# Patient Record
Sex: FEMALE | Race: WHITE | NOT HISPANIC OR LATINO
[De-identification: names, ages, dates, MRNs, and addresses within clinical notes are randomized per-mention and may not be internally consistent; named-entity substitution may affect disease eponyms.]

---

## 2019-10-20 ENCOUNTER — TRANSCRIPTION ENCOUNTER (OUTPATIENT)
Age: 44
End: 2019-10-20

## 2020-02-04 ENCOUNTER — APPOINTMENT (OUTPATIENT)
Dept: OBGYN | Facility: CLINIC | Age: 45
End: 2020-02-04
Payer: COMMERCIAL

## 2020-02-04 VITALS
WEIGHT: 195.77 LBS | SYSTOLIC BLOOD PRESSURE: 118 MMHG | BODY MASS INDEX: 30.37 KG/M2 | DIASTOLIC BLOOD PRESSURE: 70 MMHG | HEIGHT: 67.5 IN

## 2020-02-04 DIAGNOSIS — Z82.49 FAMILY HISTORY OF ISCHEMIC HEART DISEASE AND OTHER DISEASES OF THE CIRCULATORY SYSTEM: ICD-10-CM

## 2020-02-04 DIAGNOSIS — Z83.3 FAMILY HISTORY OF DIABETES MELLITUS: ICD-10-CM

## 2020-02-04 DIAGNOSIS — Z82.3 FAMILY HISTORY OF STROKE: ICD-10-CM

## 2020-02-04 DIAGNOSIS — N85.2 HYPERTROPHY OF UTERUS: ICD-10-CM

## 2020-02-04 PROCEDURE — 99386 PREV VISIT NEW AGE 40-64: CPT

## 2020-02-04 NOTE — CHIEF COMPLAINT
[Initial Visit] : initial GYN visit [FreeTextEntry1] : Moved from [place]. Last exam 2 years, normal pap at that time, no hx of abn paps.\par Menses regular,  heavier and crampier over past 2 years, last 5 days.\par Exercises 5 days/week 20 min at a time, can't lose weight.\par Stess incontinence occasionaly\par  x 2 1 C/S

## 2020-02-04 NOTE — PHYSICAL EXAM
[Awake] : awake [Alert] : alert [Acute Distress] : no acute distress [Mass] : no breast mass [Nipple Discharge] : no nipple discharge [Axillary LAD] : no axillary lymphadenopathy [Soft] : soft [Tender] : non tender [Oriented x3] : oriented to person, place, and time [Normal] : uterus [No Bleeding] : there was no active vaginal bleeding [Pap Obtained] : a Pap smear was performed [Anteversion] : anteverted [Uterine Adnexae] : were not tender and not enlarged [Enlarged ___ wks] : enlarged [unfilled] ~Uweeks [Adnexa Tenderness] : were not tender [Ovarian Mass (___ Cm)] : there were no adnexal masses

## 2020-02-04 NOTE — HISTORY OF PRESENT ILLNESS
[___ Year(s) Ago] : [unfilled] year(s) ago [Good] : being in good health [Healthy Diet] : a healthy diet [Regular Exercise] : regular exercise [Last Pap ___] : Last cervical pap smear was [unfilled] [Last Mammogram ___] : Last Mammogram was [unfilled] [Reproductive Age] : is of reproductive age [Menstrual Problems] : reports abnormal menses [Sexually Active] : is sexually active [Contraception] : does not use contraception [de-identified] : ok  if getys pregnant

## 2020-02-04 NOTE — COUNSELING
[Breast Self Exam] : breast self exam [Nutrition] : nutrition [Exercise] : exercise [Menstrual Calendar] : menstrual calendar [Vitamins/Supplements] : vitamins/supplements

## 2020-02-07 LAB — HPV HIGH+LOW RISK DNA PNL CVX: NOT DETECTED

## 2020-02-14 ENCOUNTER — FORM ENCOUNTER (OUTPATIENT)
Age: 45
End: 2020-02-14

## 2020-02-15 ENCOUNTER — APPOINTMENT (OUTPATIENT)
Dept: MAMMOGRAPHY | Facility: CLINIC | Age: 45
End: 2020-02-15
Payer: COMMERCIAL

## 2020-02-15 ENCOUNTER — OUTPATIENT (OUTPATIENT)
Dept: OUTPATIENT SERVICES | Facility: HOSPITAL | Age: 45
LOS: 1 days | End: 2020-02-15
Payer: COMMERCIAL

## 2020-02-15 ENCOUNTER — APPOINTMENT (OUTPATIENT)
Dept: ULTRASOUND IMAGING | Facility: CLINIC | Age: 45
End: 2020-02-15
Payer: COMMERCIAL

## 2020-02-15 DIAGNOSIS — Z00.8 ENCOUNTER FOR OTHER GENERAL EXAMINATION: ICD-10-CM

## 2020-02-15 PROCEDURE — 76856 US EXAM PELVIC COMPLETE: CPT

## 2020-02-15 PROCEDURE — 77063 BREAST TOMOSYNTHESIS BI: CPT | Mod: 26

## 2020-02-15 PROCEDURE — 77067 SCR MAMMO BI INCL CAD: CPT | Mod: 26

## 2020-02-15 PROCEDURE — 76856 US EXAM PELVIC COMPLETE: CPT | Mod: 26

## 2020-02-15 PROCEDURE — 77067 SCR MAMMO BI INCL CAD: CPT

## 2020-02-15 PROCEDURE — 76830 TRANSVAGINAL US NON-OB: CPT | Mod: 26

## 2020-02-15 PROCEDURE — 76830 TRANSVAGINAL US NON-OB: CPT

## 2020-02-15 PROCEDURE — 77063 BREAST TOMOSYNTHESIS BI: CPT

## 2020-02-24 DIAGNOSIS — R92.2 INCONCLUSIVE MAMMOGRAM: ICD-10-CM

## 2020-05-11 ENCOUNTER — TRANSCRIPTION ENCOUNTER (OUTPATIENT)
Age: 45
End: 2020-05-11

## 2020-07-19 ENCOUNTER — TRANSCRIPTION ENCOUNTER (OUTPATIENT)
Age: 45
End: 2020-07-19

## 2021-03-01 ENCOUNTER — TRANSCRIPTION ENCOUNTER (OUTPATIENT)
Age: 46
End: 2021-03-01

## 2021-03-08 ENCOUNTER — TRANSCRIPTION ENCOUNTER (OUTPATIENT)
Age: 46
End: 2021-03-08

## 2021-04-16 ENCOUNTER — APPOINTMENT (OUTPATIENT)
Dept: OBGYN | Facility: CLINIC | Age: 46
End: 2021-04-16
Payer: COMMERCIAL

## 2021-04-16 VITALS
HEIGHT: 67.5 IN | DIASTOLIC BLOOD PRESSURE: 70 MMHG | SYSTOLIC BLOOD PRESSURE: 120 MMHG | TEMPERATURE: 97.6 F | WEIGHT: 197 LBS | BODY MASS INDEX: 30.56 KG/M2

## 2021-04-16 DIAGNOSIS — Z82.49 FAMILY HISTORY OF ISCHEMIC HEART DISEASE AND OTHER DISEASES OF THE CIRCULATORY SYSTEM: ICD-10-CM

## 2021-04-16 DIAGNOSIS — Z72.89 OTHER PROBLEMS RELATED TO LIFESTYLE: ICD-10-CM

## 2021-04-16 DIAGNOSIS — Z12.31 ENCOUNTER FOR SCREENING MAMMOGRAM FOR MALIGNANT NEOPLASM OF BREAST: ICD-10-CM

## 2021-04-16 DIAGNOSIS — Z80.1 FAMILY HISTORY OF MALIGNANT NEOPLASM OF TRACHEA, BRONCHUS AND LUNG: ICD-10-CM

## 2021-04-16 DIAGNOSIS — Z78.9 OTHER SPECIFIED HEALTH STATUS: ICD-10-CM

## 2021-04-16 DIAGNOSIS — Z01.419 ENCOUNTER FOR GYNECOLOGICAL EXAMINATION (GENERAL) (ROUTINE) W/OUT ABNORMAL FINDINGS: ICD-10-CM

## 2021-04-16 PROCEDURE — 99396 PREV VISIT EST AGE 40-64: CPT

## 2021-04-16 PROCEDURE — 99072 ADDL SUPL MATRL&STAF TM PHE: CPT

## 2021-04-18 NOTE — HISTORY OF PRESENT ILLNESS
[Previously active] : previously active [TextBox_4] : Menses regular, NSAIDS have helped cramps and menses not as heavy.\par  [Mammogramdate] : 2/2020 [PapSmeardate] : 2/2020

## 2021-04-28 ENCOUNTER — NON-APPOINTMENT (OUTPATIENT)
Age: 46
End: 2021-04-28

## 2021-04-28 ENCOUNTER — APPOINTMENT (OUTPATIENT)
Dept: INTERNAL MEDICINE | Facility: CLINIC | Age: 46
End: 2021-04-28
Payer: COMMERCIAL

## 2021-04-28 VITALS
SYSTOLIC BLOOD PRESSURE: 114 MMHG | HEART RATE: 70 BPM | WEIGHT: 185 LBS | RESPIRATION RATE: 15 BRPM | DIASTOLIC BLOOD PRESSURE: 74 MMHG | OXYGEN SATURATION: 99 % | HEIGHT: 67 IN | BODY MASS INDEX: 29.03 KG/M2 | TEMPERATURE: 98.7 F

## 2021-04-28 DIAGNOSIS — Z00.00 ENCOUNTER FOR GENERAL ADULT MEDICAL EXAMINATION W/OUT ABNORMAL FINDINGS: ICD-10-CM

## 2021-04-28 DIAGNOSIS — Z13.31 ENCOUNTER FOR SCREENING FOR DEPRESSION: ICD-10-CM

## 2021-04-28 PROCEDURE — 96127 BRIEF EMOTIONAL/BEHAV ASSMT: CPT

## 2021-04-28 PROCEDURE — 99386 PREV VISIT NEW AGE 40-64: CPT | Mod: 25

## 2021-04-28 PROCEDURE — 99072 ADDL SUPL MATRL&STAF TM PHE: CPT

## 2021-04-28 NOTE — PAST MEDICAL HISTORY
[Regular Cycle Intervals] : have been regular [Total Preg ___] : G[unfilled] [Menstruating] : menstruating [Definite ___ (Date)] : the last menstrual period was [unfilled]

## 2021-04-28 NOTE — HEALTH RISK ASSESSMENT
[Excellent] : ~his/her~  mood as  excellent [No] : In the past 12 months have you used drugs other than those required for medical reasons? No [No falls in past year] : Patient reported no falls in the past year [0] : 2) Feeling down, depressed, or hopeless: Not at all (0) [Patient reported mammogram was normal] : Patient reported mammogram was normal [Patient reported PAP Smear was normal] : Patient reported PAP Smear was normal [HIV test declined] : HIV test declined [Hepatitis C test declined] : Hepatitis C test declined [None] : None [Alone] : lives alone [Unemployed] : unemployed [] :  [Sexually Active] : sexually active [Feels Safe at Home] : Feels safe at home [Fully functional (using the telephone, shopping, preparing meals, housekeeping, doing laundry, using] : Fully functional and needs no help or supervision to perform IADLs (using the telephone, shopping, preparing meals, housekeeping, doing laundry, using transportation, managing medications and managing finances) [Smoke Detector] : smoke detector [Carbon Monoxide Detector] : carbon monoxide detector [Seat Belt] :  uses seat belt [] : No [de-identified] : exercise regularly [[id number]] : 0 [Change in mental status noted] : No change in mental status noted [Language] : denies difficulty with language [Behavior] : denies difficulty with behavior [Reports changes in hearing] : Reports no changes in hearing [Reports changes in vision] : Reports no changes in vision [Reports changes in dental health] : Reports no changes in dental health [MammogramDate] : 01/20 [MammogramComments] : she scheduled to have for this yr. [PapSmearDate] : 02/20

## 2021-04-28 NOTE — COUNSELING
[Potential consequences of obesity discussed] : Potential consequences of obesity discussed [Benefits of weight loss discussed] : Benefits of weight loss discussed [Encouraged to maintain food diary] : Encouraged to maintain food diary [Encouraged to increase physical activity] : Encouraged to increase physical activity [Good understanding] : Patient has a good understanding of disease, goals and obesity follow-up plan [Patient motivation] : Patient motivation

## 2021-04-28 NOTE — HISTORY OF PRESENT ILLNESS
[FreeTextEntry1] : pt. is  here for a wellness exam. [de-identified] : Ms. VIMAL ALVAREZ  is    46 year female . pt.have no known medical history.\par Pt. is over all feeling well.\par she have a concern that she is not able to loose weight.She tries to eat healthy , she exercises every day.\par she also c/o hair fall for last 1 yr.\par No change is bowel and bladder habit.\par No trouble sleeping at night.\par Not on pain.\par

## 2021-04-28 NOTE — ASSESSMENT
[FreeTextEntry1] : HM:\par will do comprehensive blood work , screen for hyperlipidemia , diabetes and thyroid disorder.\par labs drawn in the office.\par will review the results and address if abnormal.\par she is up to date on mammo and PAP : result  normal.\par Didn't get flu vaccine up to date  in  tdap, covid vaccine.\par Declined for HIV and Hep C  screening test.\par Depression screening:\par EKG: Normal , no acute changes\par \par Recommended:\par Being physically active\par Maintaining a healthy weight\par Eating a diet rich in fruits, vegetables, whole grains, and low in saturated/trans fat\par Avoiding excess sun exposure.using sunscreen.\par .\par

## 2021-04-29 LAB
25(OH)D3 SERPL-MCNC: 27.6 NG/ML
ALBUMIN SERPL ELPH-MCNC: 4.6 G/DL
ALP BLD-CCNC: 65 U/L
ALT SERPL-CCNC: 12 U/L
ANION GAP SERPL CALC-SCNC: 13 MMOL/L
APPEARANCE: CLEAR
AST SERPL-CCNC: 15 U/L
BACTERIA: NEGATIVE
BASOPHILS # BLD AUTO: 0.04 K/UL
BASOPHILS NFR BLD AUTO: 0.6 %
BILIRUB SERPL-MCNC: 0.4 MG/DL
BILIRUBIN URINE: NEGATIVE
BLOOD URINE: NEGATIVE
BUN SERPL-MCNC: 9 MG/DL
CALCIUM SERPL-MCNC: 9.3 MG/DL
CHLORIDE SERPL-SCNC: 102 MMOL/L
CHOLEST SERPL-MCNC: 196 MG/DL
CO2 SERPL-SCNC: 24 MMOL/L
COLOR: NORMAL
CREAT SERPL-MCNC: 0.82 MG/DL
EOSINOPHIL # BLD AUTO: 0.13 K/UL
EOSINOPHIL NFR BLD AUTO: 2.1 %
ESTIMATED AVERAGE GLUCOSE: 100 MG/DL
GLUCOSE QUALITATIVE U: NEGATIVE
GLUCOSE SERPL-MCNC: 89 MG/DL
HBA1C MFR BLD HPLC: 5.1 %
HCT VFR BLD CALC: 40.9 %
HDLC SERPL-MCNC: 50 MG/DL
HGB BLD-MCNC: 13 G/DL
HYALINE CASTS: 1 /LPF
IMM GRANULOCYTES NFR BLD AUTO: 0.2 %
KETONES URINE: NEGATIVE
LDLC SERPL CALC-MCNC: 118 MG/DL
LEUKOCYTE ESTERASE URINE: NEGATIVE
LYMPHOCYTES # BLD AUTO: 1.24 K/UL
LYMPHOCYTES NFR BLD AUTO: 20.1 %
MAN DIFF?: NORMAL
MCHC RBC-ENTMCNC: 29 PG
MCHC RBC-ENTMCNC: 31.8 GM/DL
MCV RBC AUTO: 91.3 FL
MICROSCOPIC-UA: NORMAL
MONOCYTES # BLD AUTO: 0.51 K/UL
MONOCYTES NFR BLD AUTO: 8.3 %
NEUTROPHILS # BLD AUTO: 4.25 K/UL
NEUTROPHILS NFR BLD AUTO: 68.7 %
NITRITE URINE: NEGATIVE
NONHDLC SERPL-MCNC: 146 MG/DL
PH URINE: 6
PLATELET # BLD AUTO: 193 K/UL
POTASSIUM SERPL-SCNC: 4 MMOL/L
PROT SERPL-MCNC: 7.5 G/DL
PROTEIN URINE: NEGATIVE
RBC # BLD: 4.48 M/UL
RBC # FLD: 13 %
RED BLOOD CELLS URINE: 1 /HPF
SODIUM SERPL-SCNC: 139 MMOL/L
SPECIFIC GRAVITY URINE: 1.02
SQUAMOUS EPITHELIAL CELLS: 2 /HPF
TRIGL SERPL-MCNC: 138 MG/DL
TSH SERPL-ACNC: 1.54 UIU/ML
UROBILINOGEN URINE: NORMAL
WBC # FLD AUTO: 6.18 K/UL
WHITE BLOOD CELLS URINE: 1 /HPF

## 2021-04-30 ENCOUNTER — NON-APPOINTMENT (OUTPATIENT)
Age: 46
End: 2021-04-30

## 2021-06-30 ENCOUNTER — APPOINTMENT (OUTPATIENT)
Dept: MAMMOGRAPHY | Facility: CLINIC | Age: 46
End: 2021-06-30
Payer: COMMERCIAL

## 2021-06-30 ENCOUNTER — OUTPATIENT (OUTPATIENT)
Dept: OUTPATIENT SERVICES | Facility: HOSPITAL | Age: 46
LOS: 1 days | End: 2021-06-30
Payer: COMMERCIAL

## 2021-06-30 ENCOUNTER — RESULT REVIEW (OUTPATIENT)
Age: 46
End: 2021-06-30

## 2021-06-30 ENCOUNTER — APPOINTMENT (OUTPATIENT)
Dept: ULTRASOUND IMAGING | Facility: CLINIC | Age: 46
End: 2021-06-30
Payer: COMMERCIAL

## 2021-06-30 DIAGNOSIS — Z00.8 ENCOUNTER FOR OTHER GENERAL EXAMINATION: ICD-10-CM

## 2021-06-30 DIAGNOSIS — Z12.31 ENCOUNTER FOR SCREENING MAMMOGRAM FOR MALIGNANT NEOPLASM OF BREAST: ICD-10-CM

## 2021-06-30 PROCEDURE — 76641 ULTRASOUND BREAST COMPLETE: CPT | Mod: 26,50

## 2021-06-30 PROCEDURE — 77063 BREAST TOMOSYNTHESIS BI: CPT | Mod: 26

## 2021-06-30 PROCEDURE — 77067 SCR MAMMO BI INCL CAD: CPT | Mod: 26

## 2021-06-30 PROCEDURE — 76641 ULTRASOUND BREAST COMPLETE: CPT

## 2021-06-30 PROCEDURE — 77063 BREAST TOMOSYNTHESIS BI: CPT

## 2021-06-30 PROCEDURE — 77067 SCR MAMMO BI INCL CAD: CPT

## 2022-02-09 ENCOUNTER — TRANSCRIPTION ENCOUNTER (OUTPATIENT)
Age: 47
End: 2022-02-09

## 2022-05-31 ENCOUNTER — TRANSCRIPTION ENCOUNTER (OUTPATIENT)
Age: 47
End: 2022-05-31

## 2022-08-05 ENCOUNTER — NON-APPOINTMENT (OUTPATIENT)
Age: 47
End: 2022-08-05